# Patient Record
(demographics unavailable — no encounter records)

---

## 2018-03-20 NOTE — HP
DATE OF ADMISSION:  03/20/2018

 

HISTORY OF PRESENT ILLNESS:  The patient is a 79-year-old white female living at [facility] secondary to Parkinson's disease, schizophrenia, diabetes type 2 and hypertension.  She was transfe
rred to the emergency room today, because of a several day history of increasing edema, hypertension,
 and subsequent weakness secondary to leg pain and exertional dyspnea.  She has had some anxiety sinc
e the death of her roommate at the nursing [place] and has been unable to give significant history.  She
 denies any chest pain or palpitations.  She denies any noncompliance to her diet or medications.  
e has two-pillow orthopnea.  She has had some edema.

 

PAST MEDICAL HISTORY:  Remarkable for hypertension, hyperlipidemia, Parkinson's disease, recurrent ve
rtigo, mild dementia, schizophrenia, atrial fibrillation, gastroesophageal reflux.

 

PAST SURGICAL HISTORY:  Positive for bilateral hip replacement, cholecystectomy.

 

SOCIAL HISTORY:  She is a nonsmoker, nondrinker, living in a nursing home.

 

ALLERGIES:  No known allergies.

 

MEDICATIONS:  On admission included Januvia 100 mg daily, aspirin 81 daily, Fosamax 70 weekly, mecliz
ine 25 three times daily, metoprolol 25 twice daily, Bentyl 20 mg 3 times daily, Amaryl 4 mg daily, m
elatonin 10 mg nightly, oxybutynin 5 mg daily, Abilify 30 mg daily, Amantadine 100 mg twice daily, lo
razepam 0.5 mg twice daily, Zoloft 100 mg twice daily, buspirone 10 mg twice daily.

 

REVIEW OF SYSTEMS:

HEENT:  She denies any headache.  She does have recurrent dizziness, fairly stable on the meclizine 2
5 three times daily.  She has no change in her vision or hearing, hoarseness or dysphagia.

PULMONARY:  She has had some mild cough, but no sputum production.  Has noticed increased orthopnea.

CARDIOVASCULAR:  She denies chest pain, palpitations.  Does have two-pillow orthopnea.  Does has had 
some transient edema.

GASTROINTESTINAL:  She denies nausea, vomiting, diarrhea, constipation, abdominal pain.

GENITOURINARY:  Denies dysuria, hematuria, nocturia.

MUSCULOSKELETAL:  Denies dyspnea or swelling in joints or extremities.

NEUROLOGIC:  Denies localized numbness, weakness in arms or extremities.

 

PHYSICAL EXAMINATION:

GENERAL:  Patient is an elderly white female, in no acute distress.  This time with some mild dyspnea
 on exertion.  She is oriented x3 and a fair historian.

VITAL SIGNS:  Blood pressure of 188/86, temperature 97.9, pulse 72, respirations 21, O2 sats 93%.

HEENT:  Pupils are equal, round, and react to light and accommodation.  Sclerae are anicteric, Conjun
ctivae pale.  Oral mucous membranes well hydrated.

NECK:  Supple, no nodes or masses.  JVPs are not elevated.  Carotids 2+ and equal without bruits.

LUNGS:  Show decreased breath sounds in the bases, no wheezes, few rales above the bases.

CARDIAC:  Displays irregularly irregular rhythm.  No gallops or murmurs.  PMI in the fifth intercosta
l space, 1 cm left midclavicular line.

ABDOMEN:  Soft, nontender with no masses or organomegaly.

SKIN/EXTREMITIES:  Display trace edema, no clubbing, cyanosis.

NEUROLOGICAL:  Intact.

 

LABORATORY DATA AND X-RAY FINDINGS:  Chest x-ray shows cardiomegaly with bilateral effusions, pulmona
ry edema.  White count 4200, hematocrit 35, hemoglobin 11.  Troponin 0.33, MB 0.7.  BNP 1030.  Sodium
 138, potassium 3.9, chloride 102, bicarbonate 26, BUN 16, creatinine 0.85, glucose 86.  Accu-Cheks 1
.  Liver function studies normal.  Urinalysis does show 11-20 white cells, large leukocytes, 4+
 bacteria.

 

ASSESSMENT AND PLAN:  Uncontrolled hypertension with subsequent decompensation congestive heart failu
re, pulmonary edema, which responded to IV Lasix in the emergency room, but is still some mild respir
atory distress, still hypertensive.  She has been restarted back on her previous medications of metop
rolol 25 twice daily.  This will be increased to 50 twice daily and she will have echocardiogram done
 to evaluate for possible systolic and/or diastolic heart failure.  She will also be continued on fur
osemide 40 mg IV push tonight and in the a.m. We will have repeat basic metabolic profile, BNP in the
 a.m. as well as repeat troponin, although most likely does not show any evidence of acute ischemic e
vent on the EKG, which only shows atrial fibrillation with mild T-wave inversions, nonspecific ST abn
ormalities.  We will continue on the rate control with metoprolol.  We will also start on anticoagula
tion here in the hospital on Apixaban 2.5 mg twice daily.  She will also be started on Accu-Cheks and
 mild sliding scale and restarted back on her glimepiride 8 mg daily, and alogliptin 25 mg daily.  Fi
kyra, she will be continued on her antipsychotic medications of Abilify 5 mg daily, Buspirone 10 mg 
twice daily and her anti-Parkinson's medicines of Amantadine 100 twice daily and sertraline 100 twice
 daily.  She is not to be resuscitated at her request, but will be treated aggressively otherwise, ex
cept for a DNR status.

## 2018-03-20 NOTE — RAD
PORTABLE FRONTAL CHEST RADIOGRAPH

3/20/18

 

COMPARISON:  

2/9/16

 

HISTORY: 

Short of breath/dyspnea.

 

FINDINGS:  

The cardiac silhouette is prominent. There is pulmonary vascular congestion. There is new increased d
ensity in the right lung base suggesting right pleural fluid and nonspecific right pulmonary parenchy
mal opacity.

 

IMPRESSION:  

Pulmonary vascular congestion and prominence of the cardiac silhouette. Increased pleural and parench
ymal opacity in the right lung base suggests pulmonary edema. Right basilar infectious pneumonitis or
 aspiration cannot be excluded. Followup to resolution advised following treatment. 

 

POS: [facility]

## 2018-03-21 NOTE — PRG
DATE OF SERVICE:  03/21/2018

 

SUBJECTIVE:  The patient feels much better.  Decreased cough and dyspnea.  Slept well through the [facility]
ht.  No headaches, dizziness or chest pain and stable 1-2 pillow orthopnea.

 

OBJECTIVE:  

VITAL SIGNS:  Blood pressure down to 150/69, O2 sats 92% on room air, respirations 20, pulse 73, temp
erature was up to 100.1 last night.  

 

LABORATORY:  Laboratory this morning shows a white count 3900, hematocrit 37, hemoglobin 12, sodium 1
37, potassium down 3.5, chloride 98, bicarbonate 27, BUN 13, creatinine 0.87, glucose 107.  BNP is do
wn to 797 from 1030.  Troponin level is still pending.  

 

Intake and output shows 520 in, 3725 out.  

 

Chest x-ray is pending this morning.  

 

LUNGS:  Lungs show decreased breath sounds in the bases, but no rales or rhonchi.  

CARDIAC:  Cardiac examination shows irregular irregular rhythm.  No gallops or murmurs.  SKIN AND EXT
REMITIES:  Skin and extremities show no edema.

 

ASSESSMENT:

1.  Atrial fibrillation with rate control and anticoagulation.

2.  Uncontrolled hypertension with probable diastolic heart failure with possible acute systolic deco
mpensation, awaiting results of echocardiogram, but responding well to IV furosemide with significant
 diuresis and improvement in BNP and physical exam.  We will continue IV furosemide today and check a
 BMP in the a.m. and possibly change to oral furosemide.

3.  Uncontrolled hypertension, now controlled on metoprolol.

4.  Bipolar disorder and schizophrenia, controlled on Abilify.

5.  Diabetes type 2, stable.

6.  Parkinson's disease, stable.

7.  Urinary tract infection with culture pending, on Cipro 250 twice daily.

 

PLAN:

1.  Continue Lasix 40 mg IV twice daily today and then switch to oral tomorrow.

2.  Check echocardiogram.

3.  Repeat basic metabolic profile in the a.m. as well as BNP.

4.  Check troponin today.

5.  Start PT, OT.

6.  Start on potassium 20 mEq daily orally.  

7.  Continue psych medications.

8.  Continue Accu-Cheks and mild sliding scale and continue on home medications of glimepiride.

9.  Follow up with Dr. Yu tomorrow.

## 2018-03-21 NOTE — RAD
ONE VIEW CHEST:

 

HISTORY:

Congestive heart failure.

 

COMPARISON:

03/20/2018

 

FINDINGS:

Limited evaluation due to technique.  Heart is enlarged.  Pulmonary vessels are slightly prominent.  
There are bibasilar pleural and parenchymal changes.  Stable opacification of the right lung base.  N
o pneumothorax or osseous abnormalities.

 

IMPRESSION:

1.  Cardiomegaly.

 

2.  Pulmonary vascular congestion.

 

3.  Bibasilar pleural and parenchymal changes.

 

4.  Congestive heart failure.  Continued surveillance.

 

POS: [facility]

## 2018-03-22 NOTE — PRG
DATE OF SERVICE:  03/22/2018

 

SUBJECTIVE:  Ms. Álvarez is doing well.  Denies any complaints.  She apparently ambulated with therapy
.  She states her leg swelling has pretty much resolved.  She denies any chest pain or shortness of b
reath.  I advised her that I am going to switch her to oral antibiotics and oral Lasix.  Remove her F
oley catheter and hopefully get her discharged tomorrow.

 

OBJECTIVE:

VITAL SIGNS:  She is afebrile, heart rate 67, respirations 20, oxygen saturation 93% on room air, blo
od pressure 133/68.

CARDIOVASCULAR SYSTEM:  S1, S2 plus.

RESPIRATORY SYSTEM:  Normal vesicular breath sounds.

ABDOMEN:  Soft, nontender, bowel sounds heard in all quadrants.

EXTREMITIES:  Without cyanosis or clubbing.  Trace edema.  Weight has gone down from 182-178 pounds.

 

IMAGING DATA:  Echocardiogram shows ejection fraction of 50%-55% with mild left ventricular hypertrop
hy, mild to moderate tricuspid regurgitation, mild pulmonic regurgitation, and mild mitral regurgitat
ion.  She was in atrial fibrillation throughout the study.

 

IMPRESSION:

1.  Escherichia coli urinary tract infection.

2.  Likely diastolic congestive heart failure.

3.  Atrial fibrillation.

4.  Gastroesophageal reflux disease.

5.  Mild dementia.

6.  Dyslipidemia.

7.  Hypertension.

 

PLAN:

1.  Switch to p.o. Lasix.

2.  Decrease potassium to 10 mg daily.

3.  Discontinue Cipro and start her on Macrobid.

4.  Continue therapy.

5.  Discontinue Larios catheter.

6.  Recheck laboratory values in the morning and anticipate discharging her back to the nursing home 
tomorrow.

## 2018-03-23 NOTE — DIS
DATE OF ADMISSION:  03/20/2018

 

DATE OF DISCHARGE:  03/23/2018

 

PRINCIPAL DIAGNOSES:

1.  Acute congestive heart failure, likely diastolic, resolved.

2.  Urinary tract infection with Escherichia coli on Macrobid.

3.  Mild Alzheimer's type dementia.

4.  Atrial fibrillation.

5.  Gastroesophageal reflux disease.

6.  Dyslipidemia.

7.  Hypertension.

 

COMPLICATIONS:  None.

 

ADVERSE REACTIONS:  None.

 

PROCEDURES:  None.

 

CONSULTATIONS:  None.

 

HOSPITAL COURSE:  The patient was admitted by Dr. Faheem Montaño on 03/20/2018 with increasing edema, 
shortness of breath, and weakness.  Evaluation showed elevated BNP and possible UTI.  She was started
 on IV Cipro and IV Lasix.  She responded very well.  She has been switched to oral Lasix and the uri
ne cultures grew E. coli which is resistant to Cipro.  She has been switched to Macrobid.  She has be
en ambulating with assistant and she is off her oxygen.  Her leg swelling has resolved.  She has lost
 about 4 pounds and she was deemed stable for discharge back to the nursing facility.

 

DISCHARGE MEDICATIONS:

1.  Januvia 100 mg daily.

2.  Aspirin 81 mg daily.

3.  Fosamax 70 mg every weekly.

4.  Meclizine 25 mg t.i.d. p.r.n.

5.  Metoprolol 25 mg b.i.d.

6.  Bentyl 20 mg t.i.d.

7.  Amaryl 4 mg daily.

8.  Melatonin 10 mg at night.

9.  Oxybutynin 5 mg daily.

10.  Abilify 30 mg daily.

11.  Amantadine 100 mg b.i.d. that is for Parkinson's.

12.  Zoloft 100 mg a day.

13.  BuSpar 10 mg b.i.d.

14.  Lasix 20 mg daily.

15.  Potassium 10 mEq daily.

 

She is to be on an 1800 calorie Heart healthy ADA diet.  She is to get BMP rechecked on 03/28/2018.  
PT/OT to evaluate and treat her in the nursing facility.  She will be followed by Dr. Faheem ramirez the nursing facility.  No prescriptions needed.

 

PHYSICAL EXAMINATION:

VITAL SIGNS:  On the day of discharge, she is afebrile, heart rate 64, respirations 20, oxygen satura
tion 94% on room air, blood pressure 126/74.

CARDIOVASCULAR SYSTEM:  S1 and S2 plus.  Rate and rhythm regular.

RESPIRATORY SYSTEM:  Normal vesicular breath sounds.

ABDOMEN:  Soft, nontender, bowel sounds heard in all quadrants.

EXTREMITIES:  Without cyanosis or clubbing.  Peripheral pulses are palpable.

CENTRAL NERVOUS SYSTEM:  Grossly nonfocal.

 

For full details, please see chart.

## 2018-10-01 NOTE — PDOC.FPRHP
- History of Present Illness


Chief Complaint: Speech changes


History of Present Illness: 


Ms Álvarez is a 78 yo nursing home resident with pmh of Afib, DMII, HTN, HLD 

presenting with left sided weakness and speech changes that started this 

morning. She ambulates with a walker. This AM she slid from her bed onto her 

left hip. She was taken for Xray of her L hip and shortly after developed 

speech changes and left sided weakness. Pt is unaware of any changes and states 

she feels fine. Denies hx of CVA. Denies HA, weakness. 


Per [facility] nurse who cares for her daily she does speak 

with a soft spoken voice, uses a walker to ambulate but no unilateral weakness. 

Family comes to visit, they live far away but call frequently. Pulse usually 

runs in the 60's but has never seen it in the 40's





ED Course: 


ASA








- Allergies/Adverse Reactions


 Allergies











Allergy/AdvReac Type Severity Reaction Status Date / Time


 


No Known Allergies Allergy   Verified 10/01/18 19:00














- Home Medications


 











 Medication  Instructions  Recorded  Confirmed  Type


 


Alendronate Sodium [Fosamax] 70 mg PO Q7D 06/28/13 10/01/18 History


 


Amantadine HCl [Amantadine] 100 mg PO BID 06/28/13 10/01/18 History


 


Aripiprazole [Abilify] 40 mg PO HS 06/28/13 10/01/18 History


 


Aspirin [Aspir 81] 81 mg PO DAILY 06/28/13 10/01/18 History


 


Calcium 600 + Vit D Tablet 1 tab PO DAILY 06/28/13 10/01/18 History


 


Glimepiride [Amaryl] 4 mg PO QAM-WM 06/28/13 10/01/18 History


 


Januvia 1 tab PO DAILY 06/28/13 10/01/18 History


 


Meclizine HCl [Antivert] 25 mg PO TID 06/28/13 10/01/18 History


 


Metoprolol Tartrate [Lopressor] 25 mg PO BID 06/28/13 10/01/18 History


 


Aluminum & Magnesium Hydroxide 30 ml PO Q4HR PRN 03/20/18 10/01/18 History





[Maalox]    


 


Dicyclomine [Bentyl] 20 mg PO TID 03/20/18 10/01/18 History


 


Melatonin 10 mg PO HS 03/20/18 10/01/18 History


 


Oxybutynin Chloride 5 mg PO HS 03/20/18 10/01/18 History


 


Sertraline HCl [Zoloft] 200 mg PO DAILY 03/20/18 10/01/18 History


 


busPIRone HCl [Buspirone HCl] 10 mg PO BID 03/20/18 10/01/18 History


 


Acetaminophen [Tylenol Regular 650 mg PO Q6H PRN  tab 03/23/18 10/01/18 Rx





Strength]    


 


Furosemide [Lasix] 20 mg PO DAILY  tab 03/23/18 10/01/18 Rx


 


Magnesium Hydroxide [Milk of 400 mg PO BID PRN #0 03/23/18 10/01/18 Rx





Magnesia]    


 


Potassium Chloride [K-Dur] 10 meq PO QAM-WM  tab 03/23/18 10/01/18 Rx


 


Ipratropium-Albuterol [Combivent] 2 puff INH BID PRN 10/01/18 10/01/18 History


 


Loperamide HCl [Loperamide] 2 mg PO ASDIR PRN 10/01/18 10/01/18 History


 


guaiFENesin/Dextromethorphan 5 ml PO Q6H PRN 10/01/18 10/01/18 History





[Guaifenesin Dm Syrup]    














- History


PMHx: Parkinson's, Dementia, bipolar?, A-fib, DMII, GERD, HLD, HTN, osteoporosis


 


PSHx: b/l hip replacement





FHx: Mother- MI


 


Social: Denies tobacco, alcohol or drug use. Lives in a nursing facility


 








- Review of Systems


General: denies: fever/chills, weight/appetite/sleep changes, night sweats


Eyes: denies: eye pain, vision changes


ENT: denies: nasal congestion, rhinorrhea


Respiratory: denies: cough, congestion, shortness of breath


Cardiovascular: denies: chest pain, palpitation, edema


Gastrointestinal: denies: nausea, vomiting, diarrhea, constipation, abdominal 

pain


Genitourinary: denies: dysuria, polyuria


Skin: denies: rashes, lesions


Musculoskeletal: denies: pain, tenderness, swelling


Neurological: denies: numbness, syncope, weakness


Psychological: denies: anxiety, depression





- Vital signs


BP: 148  HR: 57 RR: 49 Tmax: 15 Pox: 95% on RA  Wt: 68   








- Physical Exam


Constitutional: NAD, awake, alert and oriented, well developed


HEENT: normocephalic and atraumatic, TM's clear and intact (right ear impacted)

, normal nasal mucosa, MMM, oropharynx clear, other (upper dentures)


Neck: supple, trachea midline


Heart: RRR, pulses present, no edema


Lungs: CTAB, no wheezing


Abdomen: soft, non-tender, bowel sounds present, no masses/distention


Neurological: CN II-XII intact, DTRs 2+, other (5/5 strenght LE & UE)


Skin: capillary refill <2 seconds


Psychiatric: normal mood and affect, good judgment and insight, intact recent 

and remote memory





FMR H&P: Results





- EKG Interpretation


EKG: 


Sinus sandie, PACs, 1st degree AV block





- Radiology Interpretation


  ** CT scan - head


Status: report reviewed by me


Additional comment: 


No acute abnormalities





FMR H&P: A/P





- Problem List


(1) TIA (transient ischemic attack)


Current Visit: Yes   Status: Acute   Code(s): G45.9 - TRANSIENT CEREBRAL 

ISCHEMIC ATTACK, UNSPECIFIED   





(2) Atrial fibrillation


Current Visit: No   Status: Chronic   Code(s): I48.91 - UNSPECIFIED ATRIAL 

FIBRILLATION   


Qualifiers: 


   Atrial fibrillation type: chronic   Qualified Code(s): I48.2 - Chronic 

atrial fibrillation   





(3) Diabetes type 2, controlled


Current Visit: No   Status: Chronic   Code(s): E11.9 - TYPE 2 DIABETES MELLITUS 

WITHOUT COMPLICATIONS   





(4) Dyslipidemia


Current Visit: No   Status: Chronic   Code(s): E78.5 - HYPERLIPIDEMIA, 

UNSPECIFIED   





(5) Osteoporosis


Current Visit: No   Status: Chronic   Code(s): M81.0 - AGE-RELATED OSTEOPOROSIS 

W/O CURRENT PATHOLOGICAL FRACTURE   





(6) Parkinsonian features


Current Visit: No   Status: Chronic   Code(s): R25.9 - UNSPECIFIED ABNORMAL 

INVOLUNTARY MOVEMENTS   





(7) HTN (hypertension)


Current Visit: Yes   Status: Acute   Code(s): I10 - ESSENTIAL (PRIMARY) 

HYPERTENSION   





- Plan





Left sided weakness/Speech changes


- TIA vs CVA


- No focal deficits on exam, pt appears to be at baseline speech. 


- CT: no acute changes


- PT/OT eval


- Dysphagia screen


- Admit to Stroke


- MRI


- No CTA of neck, pt not candidate for endarterectomy


- Continue to monitor overnight


- Continue ASA





Fall


- Slipped out of bed this AM onto left hip


- Uses walker to ambulate


- PT/OT eval





Paroxysmal Afib


- Hold metoprolol for bradycardia





DMII


- Continue home Januvia, glimepiride


- Mild SSI


- ACHS accuchecks


- hypoglycemic protocol





Incontience


- Oxybutynin





Bipolar Disorder


- Zoloft, abilify, Buspirone





HTN


- Holding metoprolol for bradycardia





IBS


- Continue home Bentyl





Osteoporosis


- Pt on Ca/Vit D3, and fosamax





Insomnia


- Continue home melatonin





Vertigo


- Continue home meclizine





Code Status: FULL


DVT ppx: Lovenox














FMR H&P: Upper Level





- Pertinent history





Ms. Álvarez is a 78 yo F with a PMHx of a fib, T2DM, HTN, HLD, Parkinson's and 

Bipolar D/O who presents as a [facility] from [place] for AMS. She seemed altered to NH 

staff and was noted to have slurred speech and L sided weakness in arm and leg 

1 hour prior to presentation at [place] ED. She typically ambulates with a 

walker and slid from her bed this morning onto her L hip which was xray-ed at 

OSH. Shortly after that she was noted to have changes above. She does not 

notice any weakness or changes in her speech and per NH staff she speaks softly 

and is at times difficult to understand. 








- Pertinent findings





Exam: VSS apart from bradycardia and mild HTN


Gen: awake, alert, oriented x3


HEENT: PERRL, EOMI, sclera anicteric, no carotid bruits


CV: RRR, no murmur noted


RESP: CTAB


ABD: soft, nontender to palpation


Ext: No peripheral edema, pulses 1+ throughout


Neuro: CN II-XII intact, strength 4/5 in LUE () and LLE on extension, 5/5 

in RUE and RLE





- Plan


Date/Time: 10/01/18 1503





78 yo F presents as [facility]CO[facility] for concern for stroke





1. LUE and LLE weakness: TIA vs CVA. Patient appears to be at baseline for 

speech - no slurring noted but difficult to understand. CT brain negative. Will 

admit to stroke, dysphagia screen and neurochecks q4 hours. Will plan for MRI 

in the morning. Continue ASA. Will discuss CTA. 


2. Fall: Hip xray negative. Slipped out of bed this AM onto left hip. PT/OT 

eval.


3. Paroxysmal a fib: Will hold metoprolol at this time for bradycardia in the 

40s.


4. T2DM: Continue home meds, mild SSI with accuchecks


5. Incontinence: Oxybutynin


6. Bipolar Disorder: Zoloft, abilify, Buspirone


7. HTN: Slightly elevated. Holding metoprolol. Goal 150/90. Consider different 

agent. Permissive HTN.


8. IBS: Home bentyl


9. Osteoporosis: Home Ca/Vit D3, and fosamax


10. Insomnia: Melatonin


11. Vertigo: Meclizine. Asymptomatic at this time. Increases fall risk.





Code Status: DNR





DVT ppx: SCDs





I, Layla López MD, PGY-3, have evaluated this patient and agree with findings/

plan as outlined by intern resident. Pertinent changes/additions are listed 

here.








Attending Addendum





- Attending Addendum


Date/Time: 10/01/18 4007





I personally evaluated the patient and discussed the management with Dr. Tai and Dr. López


I agree with the History, Examination, Assessment and Plan documented above 

with any addition or exceptions noted below.





78 yo female with multiple medical problems presents to ER for evaluation of 

left sided facial drop, fall, left sided weakness, and change in speech. 


Past history of TIA vs CVA per patient. Completely oriented at this time. No 

facial droop. Left sided weakness at baseline. Difficulty with speech but 

unsure what exactly her baseline is. Will discuss with nursing staff at SNF. 


VS reviewed. 


Labs reviewed. 


Imaging reviewed. 





Admit for CVA rule out. No hemorrhage on CT scan. Will order MRI in AM. 

Continue to monitor closely. Symptoms appear resolved. Will follow up with 

nursing staff at SNF to understand patient's baseline speech and left sided 

weakness. Give full dose ASA. Monitor BP. Place on tele. Order ECHO. Start 

statin. Consult stroke team. Consider carotid imaging. No bruits on exam. Will 

discuss if patient interested in surgical option if needed prior to imaging. 


Adjust all other home meds as needed. 





Latoya

## 2018-10-01 NOTE — CT
CT BRAIN WITHOUT CONTRAST:

 

Date:  10/01/18 

 

HISTORY:  

TIA. 

 

FINDINGS:

 

Comparison made with exam of 02/09/16. 

 

No evidence of infarct, hemorrhage, midline shift, or abnormal extra-axial fluid collections are seen
. The ventricular size is appropriate and the basilar cisterns are patent. The bony calvarium is inta
ct. The visualized paranasal sinuses and mastoid air cells are well aerated. 

 

IMPRESSION: 

No CT evidence of acute intracranial process.  

 

POS: [facility]

## 2018-10-02 NOTE — CON
DATE OF CONSULTATION:  10/02/2018

 

CONSULTING PHYSICIAN:  [facility].

 

IMPRESSION:

1.  Probable cardioembolic stroke secondary to intermittent atrial fibrillation.

2.  Bradycardia secondary to beta blockers.

3.  Diabetes.

4.  Tremor.

5.  Mild dementia.

 

PLAN:

1.  Continue aspirin.

2.  Add Plavix.

3.  Carotid ultrasound.

 

HISTORY OF PRESENT ILLNESS:  Ms. Álvarez is a 79-year-old woman who came in from the [facility].  She apparently slipped from her bed and fell to the floor.  She was brought in for evaluation.
  She was subsequently noted to have a bit of weakness on the left side.  Her speech was a bit slurre
d, which lasted for a short while.  She was noted to be in intermittent atrial fibrillation.  She has
 had a history of falls in the past, breaking both hips.  Dr. Robison evaluated her for the atrial f
ibrillation and did not suggest anticoagulants due to her risk of falling.  She was quite bradycardic
 and has discontinued her beta blocker.  She is without any particular focal neurologic complaints at
 this point.

 

PAST MEDICAL HISTORY:  As listed above.

 

ALLERGIES:  None reported.

 

SOCIAL HISTORY:  No tobacco use.

 

FAMILY HISTORY:  Noncontributory.

 

REVIEW OF SYSTEMS:  No complaint of headache, nausea, dizziness, chest pain, shortness of breath.

 

PHYSICAL EXAMINATION:

VITAL SIGNS:  Blood pressure 141/150, pulse 52, respirations 15, temperature 96.

HEENT:  Pupils are equal and reactive.  Conjunctivae clear.  Oropharynx is clear.

NECK:  No lymphadenopathy noted.

EXTREMITIES:  No cyanosis present.

NEUROLOGIC:  She was alert and oriented to person and place.  Her speech was fluent and clear.  She f
ollowed commands appropriately.  She seemed quite appropriate in her demeanor as well.  Cranial nerve
s were intact.  Motor exam showed a fix with arm roll testing, but otherwise, her strength seemed umair
te symmetric.  Sensation was equal to light touch.  Gait was not tested.  Plantar response was downgo
ing on the right and upgoing on the left.  No abnormal movements were seen.

 

MRI of the brain showed a patchy area in the right watershed region showing acute stroke.

 

SUMMARY:  This is an elderly lady who had a minor neurologic deficit as a result of a probable cardio
embolic event, suspect that either that or she had a drop in pressure that resulted in watershed isch
emia.  Ruling out a high-grade stenosis in the right carotid would be appropriate, otherwise adding P
lavix would seem to be a reasonable option short of going on to anticoagulants.

## 2018-10-02 NOTE — PRG
DATE OF SERVICE:  10/02/2018

 

Ms. Álvarez is a 79-year-old white female who was admitted with TIA type symptoms.  We have still pend
ing an MRI of the brain.  She was also noted during sleep to drop her heart rate down into the 30s an
d while awake into the 40s all of which have been asymptomatic.  However, one of her EKGs does show a
 junctional rhythm with a slow rate.  I am concerned about the possibility we are seeing an impending
 development of sick sinus syndrome.  In any event, we will wait and consult Cardiology for their inp
ut given these episodes of slurred speech.  We will continue with our TIA workup and treatment.

## 2018-10-02 NOTE — PDOC.FM
- Subjective


Subjective: 


No overnight events. Denies weakness, SOB, CP, palpitations, dizziness, 

lightheadedness. No questions or concerns. 








- Objective


MAR Reviewed: Yes


Vital Signs & Weight: 


 Vital Signs (12 hours)











  Temp Pulse Resp BP Pulse Ox


 


 10/02/18 04:07  98 F  46 L  16  132/77  92 L


 


 10/01/18 22:55  98.8 F  45 L  12  137/62  92 L


 


 10/01/18 20:24   48 L   132/60 


 


 10/01/18 19:06  99.0 F  48 L  19  115/74  93 L








 Weight











Weight                         76.793 kg














I&O: 


 











 09/30/18 10/01/18 10/02/18





 06:59 06:59 06:59


 


Intake Total   340


 


Output Total   400


 


Balance   -60














Phys Exam





- Physical Examination


Constitutional: NAD


Respiratory: no wheezing, clear to auscultation bilateral


Cardiovascular: RRR, no significant murmur


Gastrointestinal: soft, non-tender, positive bowel sounds


Musculoskeletal: no edema, pulses present


Neurological: non-focal, moves all 4 limbs


5/5 UE & LE strength


Psychiatric: normal affect, A&O x 3


Skin: normal turgor, cap refill <2 seconds





Dx/Plan


(1) TIA (transient ischemic attack)


Code(s): G45.9 - TRANSIENT CEREBRAL ISCHEMIC ATTACK, UNSPECIFIED   Status: 

Acute   





(2) Atrial fibrillation


Code(s): I48.91 - UNSPECIFIED ATRIAL FIBRILLATION   Status: Chronic   


Qualifiers: 


   Atrial fibrillation type: chronic   Qualified Code(s): I48.2 - Chronic 

atrial fibrillation   





(3) Diabetes type 2, controlled


Code(s): E11.9 - TYPE 2 DIABETES MELLITUS WITHOUT COMPLICATIONS   Status: 

Chronic   





(4) Dyslipidemia


Code(s): E78.5 - HYPERLIPIDEMIA, UNSPECIFIED   Status: Chronic   





(5) Osteoporosis


Code(s): M81.0 - AGE-RELATED OSTEOPOROSIS W/O CURRENT PATHOLOGICAL FRACTURE   

Status: Chronic   





(6) Parkinsonian features


Code(s): R25.9 - UNSPECIFIED ABNORMAL INVOLUNTARY MOVEMENTS   Status: Chronic   





(7) HTN (hypertension)


Code(s): I10 - ESSENTIAL (PRIMARY) HYPERTENSION   Status: Acute   





- Plan


Plan: 


Left sided weakness/Speech changes


- TIA vs CVA


- No focal deficits on exam, pt appears to be at baseline speech. 


- CT: no acute changes


- PT/OT eval


- MRI today


- No CTA of neck, pt not candidate for endarterectomy


- Continue ASA





Asymptomatic Bradycardia


- As low as 36 overnight


- EKG at outside facility NSR with PACs


- EKG repeated here 10/1 with junctional rhythm


- Consult Cardiology





Fall


- Slipped out of bed 10/1 onto left hip


- Xrays preformed prior to weakness/speech changes


- Uses walker to ambulate


- PT/OT eval





Paroxysmal Afib


- Continue to monitor on tele





DMII


- Continue home Januvia, glimepiride


- Mild SSI


- ACHS accuchecks


- hypoglycemic protocol





Incontinence


- Oxybutynin





Bipolar Disorder


- Zoloft, abilify, Buspirone





HTN


- Holding metoprolol for bradycardia





IBS


- Continue home Bentyl





Osteoporosis


- Pt on Ca/Vit D3, and fosamax





Insomnia


- Continue home melatonin





Vertigo


- Continue home meclizine





Code Status: FULL


DVT ppx: Lovenox

## 2018-10-02 NOTE — CON
DATE OF CONSULTATION:  10/02/2018

 

HISTORY OF PRESENT ILLNESS:  The patient is a 79-year-old woman who presented with difficulty speakin
g and weakness.  The patient has a history of paroxysmal atrial fibrillation.  She lives in the [facility].  Apparently she had a fall and had difficulty speaking.  The patient denies having 
any chest discomfort or palpitations.

 

PAST MEDICAL HISTORY:  

1.  Hypertension.

2.  Atrial fibrillation.

3.  Parkinson's disease.

4.  History of dementia.

 

PAST SURGICAL HISTORY:  She has had hip surgery and cholecystectomy.

 

MEDICATIONS:  Include glimepiride 8 mg daily, aspirin 81, Januvia 100 daily, lorazepam 0.5 twice a da
y, Aricept 10 daily, amantadine 100 b.i.d., meclizine 25 daily, metoprolol 25 b.i.d., Zoloft 100 memo
y, Bentyl 20 mg 3 times a day, oxybutynin 5 mg t.i.d., trazodone 50 daily, melatonin 3 mg daily, and 
Abilify 30 daily.

 

SOCIAL HISTORY:  She lives in the [facility].  Nonsmoker.

 

FAMILY HISTORY:  No strong family history of coronary artery disease.

 

REVIEW OF SYSTEMS:  Ten-point system otherwise unremarkable.

 

PHYSICAL EXAMINATION:

GENERAL:  This is an elderly woman in no acute distress.

VITAL SIGNS:  Blood pressure was 104/64.

NECK:  Showed no jugular vein distention.

LUNGS:  Clear to auscultation.

HEART:  Regular rate and rhythm, normal S1, S2 with a 3/6 systolic murmur.

ABDOMEN:  Nondistended.

EXTREMITIES:  No edema.

VASCULAR:  Radial pulses are 2+.

 

LABORATORY RESULTS:  Her white blood count 5.5, hemoglobin 11.1, hematocrit 36.1, platelets 180.  Sod
ium was 139, potassium 3.8, chloride 105, bicarbonate 25.  Her BUN was 18, creatinine was 0.78.  Trop
onin was 0.018.  

 

Her EKG revealed her to have an ectopic atrial rhythm with a heart rate of 48.

 

IMPRESSION:

1.  Ectopic atrial rhythm.

2.  Cerebrovascular accident.

3.  Hypertension.

4.  History of atrial fibrillation.

5.  Dementia.

6.  Diabetes mellitus.

 

This patient presents with a cerebrovascular accident.  The patient has marked bradycardia.  She is o
n metoprolol.  The patient was on a beta-blocker, this medication has been discontinued.  We will mon
itor the patient with you through her hospitalization.  She has a history of atrial fibrillation and 
has suffered a cerebrovascular accident.  The patient would be a poor candidate for anticoagulation t
herapy with a history of multiple falls and hip fracture.  We will follow this patient with you throu
gh her hospitalization.

## 2018-10-02 NOTE — MRI
MRI BRITTA WITH AND WITHOUT IV CONTRSAT:

 

HISTORY: 

TIA, left-sided weakness.

 

FINDINGS: 

Correlation is made with the CT scan of the previous day.

 

There are multiple foci of restricted diffusion in the watershed zone of the right cerebral hemispher
e.  This is consistent with a right watershed infarct.  Additionally, there is a small focal area of 
restricted diffusion in the right posterior frontal cortex consistent with acute infarction.  No hemo
rrhage is seen.  No midline shift or abnormal extraaxial fluid collections are identified.  No eviden
ce of mass or abnormal postcontrast enhancement is identified.  The ventricular size is appropriate a
nd the basilar cisterns are patent.  There are changes of chronic small-vessel ischemic disease in th
e periventricular white matter. 

 

IMPRESSION: 

1.  Acute multiple tiny lacunar infarctions in the watershed zone of the right cerebral hemisphere.

 

2.  Small acute right posterior frontal lobe cortical infarction.

 

POS: ELA

## 2018-10-03 NOTE — PRG
DATE OF SERVICE:  10/03/2018

 

This is an addendum to the note of Dr. Michela Tai.  

 

Ms. Álvarez it continues to feel well.  She has been seen by Cardiology and Neurology and we appreciat
e their recommendations.  Neurology has suggested that we look at her carotid arteries with ultrasoun
d and also added Plavix.  We will discuss further with Cardiology any need for further workup for the
 patient's bradyarrhythmias.  Her beta blockers have been stopped, but she had not been on them excep
t for 1 tablet prior to admission to the hospital.

## 2018-10-03 NOTE — PDOC.FM
- Subjective


Subjective: 


No overnight events. Feeling well and strength is back. She does recognize when 

her blood sugar is low as she feels fatigued and sweaty. No questions or 

concerns.





- Objective


MAR Reviewed: Yes


Vital Signs & Weight: 


 Vital Signs (12 hours)











  Temp Pulse Resp BP Pulse Ox


 


 10/03/18 04:14  97.9 F  39 L  20  125/56 L  95


 


 10/03/18 00:00  98.7 F  46 L  18  127/60  92 L


 


 10/02/18 19:25  98.6 F  48 L  12  149/65 H  97








 Weight











Weight                         76.793 kg














I&O: 


 











 10/01/18 10/02/18 10/03/18





 06:59 06:59 06:59


 


Intake Total  340 720


 


Output Total  400 


 


Balance  -60 720














Phys Exam





- Physical Examination


Constitutional: NAD


Neck: supple


Respiratory: no wheezing, clear to auscultation bilateral


Cardiovascular: RRR, no significant murmur


Gastrointestinal: soft, non-tender, positive bowel sounds


Neurological: moves all 4 limbs


5/5  wrist 


Psychiatric: normal affect, A&O x 3





Dx/Plan


(1) TIA (transient ischemic attack)


Code(s): G45.9 - TRANSIENT CEREBRAL ISCHEMIC ATTACK, UNSPECIFIED   Status: 

Acute   





(2) Atrial fibrillation


Code(s): I48.91 - UNSPECIFIED ATRIAL FIBRILLATION   Status: Chronic   


Qualifiers: 


   Atrial fibrillation type: chronic   Qualified Code(s): I48.2 - Chronic 

atrial fibrillation   





(3) Diabetes type 2, controlled


Code(s): E11.9 - TYPE 2 DIABETES MELLITUS WITHOUT COMPLICATIONS   Status: 

Chronic   





(4) Dyslipidemia


Code(s): E78.5 - HYPERLIPIDEMIA, UNSPECIFIED   Status: Chronic   





(5) Osteoporosis


Code(s): M81.0 - AGE-RELATED OSTEOPOROSIS W/O CURRENT PATHOLOGICAL FRACTURE   

Status: Chronic   





(6) Parkinsonian features


Code(s): R25.9 - UNSPECIFIED ABNORMAL INVOLUNTARY MOVEMENTS   Status: Chronic   





(7) HTN (hypertension)


Code(s): I10 - ESSENTIAL (PRIMARY) HYPERTENSION   Status: Acute   





- Plan


Plan: 


Acute CVA


- Cardioembolic 2/2 paroxysmal afib vs watershed ischemia due to hypotension


- Pt appears to be back at baseline with no focal neurological deficits


- CT: no acute changes


- MRI: Acute multiple tiney lacunar infarts in watershed zone of right cerebral 

hemishphere. Small acute right posterior frontal lobe cortical infarct


- PT/OT 


- No CTA of neck, pt not candidate for endarterectomy


- Continue ASA


- Will start Plavix


- Not a candidate for anticoagulation due to recurrent falls. 





Asymptomatic Bradycardia


- Ectopic atrial rhythm


- As low as 39 overnight


- EKG at outside facility NSR with PACs


- EKG repeated here 10/1 with junctional rhythm


- Consulted Cardiology, apprec recs


- Pt had been on BB in past but after talking with PCP Dr Montaño she has not 

been taking it for some time now. Pulse was in the high 70's at admission. She 

did receive a dose the evening of 10/1/18 but this was discontinued due to her 

bradycardia overnight.





Fall


- Slipped out of bed 10/1 onto left hip


- Xrays preformed prior to weakness/speech changes


- Uses walker to ambulate


- PT/OT eval





Paroxysmal Afib


- Continue to monitor on tele


- Not a candidate for anticoagulation due to recurrent falls.





Hypoglycemia


- Pt is DMII, she has had recurrent episodes of symptomatic hypoglycemia as low 

as nurse reported 30's.


- Speaking with her yesterday she was very hot and appeared drowsy with blood 

glucose of 58. 


- As low as 68 overnight


- Will stop Glimepiride





DMII


- Continue home Januvia


- Stop glimepiride due to hypoglycemia


- Mild SSI


- ACHS accuchecks


- hypoglycemic protocol





Incontinence


- Oxybutynin





Bipolar Disorder


- Zoloft, abilify, Buspirone





HTN


- Holding metoprolol for bradycardia





IBS


- Continue home Bentyl





Osteoporosis


- Pt on Ca/Vit D3, and fosamax





Insomnia


- Continue home melatonin





Vertigo


- Continue home meclizine





Code Status: FULL


DVT ppx: Lovenox

## 2018-10-03 NOTE — ULT
CAROTID DOPPLER ULTRASOUND:

 

Date:  10/03/18 

 

HISTORY:  

CVA. 

 

COMPARISON:  

None. 

 

TECHNIQUE:  

Real-time Gray scale, color Doppler, and spectral analysis of the extracranial carotid and vertebral 
arteries was performed. 

 

FINDINGS: 

There is moderate atherosclerotic plaque of both carotid bulbs. Antegrade flow both vertebral arterie
s. 

 

No elevated peak systolic velocities within the internal carotid arteries. 

 

Right ICA/CCA ratio is 1.26. 

Left ICA?CCA ratio is 1.13. 

 

IMPRESSION: 

No hemodynamically significant stenosis. Moderate atherosclerotic plaque. 

 

 

POS: KYLE

## 2018-10-04 NOTE — PDOC.FM
- Subjective


Subjective: 


Feeling well this morning. Has been able to get up and walk to the restroom. 

Reports not having BM since admission but feels that she needs to have one 

currently. Reports 1 episode of symptomatic hypoglycemia overnight that 

resolved with orange juice. Patient is not interested in any invasive 

procedures. She said she needs to discuss things with her son if any 

intervention is recommended. No other questions or concerns. 








- Objective


MAR Reviewed: Yes


Vital Signs & Weight: 


 Vital Signs (12 hours)











  Temp Pulse Resp BP Pulse Ox


 


 10/04/18 04:00  97.7 F  45 L  16  151/69 H  95


 


 10/04/18 00:00  97.9 F  51 L  19  169/71 H  92 L


 


 10/03/18 20:00      94 L


 


 10/03/18 19:51  98.6 F  49 L  20  181/70 H  94 L








 Weight











Weight                         76.793 kg














I&O: 


 











 10/02/18 10/03/18 10/04/18





 06:59 06:59 06:59


 


Intake Total 


 


Output Total 400  


 


Balance -60 720 1450














Phys Exam





- Physical Examination


Constitutional: NAD


Neck: supple


Respiratory: no wheezing, clear to auscultation bilateral


Cardiovascular: RRR, no significant murmur


Gastrointestinal: soft, non-tender, positive bowel sounds


Musculoskeletal: no edema, pulses present


Psychiatric: normal affect, A&O x 3


Skin: cap refill <2 seconds





Dx/Plan


(1) TIA (transient ischemic attack)


Code(s): G45.9 - TRANSIENT CEREBRAL ISCHEMIC ATTACK, UNSPECIFIED   Status: 

Acute   





(2) Atrial fibrillation


Code(s): I48.91 - UNSPECIFIED ATRIAL FIBRILLATION   Status: Chronic   


Qualifiers: 


   Atrial fibrillation type: chronic   Qualified Code(s): I48.2 - Chronic 

atrial fibrillation   





(3) Diabetes type 2, controlled


Code(s): E11.9 - TYPE 2 DIABETES MELLITUS WITHOUT COMPLICATIONS   Status: 

Chronic   





(4) Dyslipidemia


Code(s): E78.5 - HYPERLIPIDEMIA, UNSPECIFIED   Status: Chronic   





(5) Osteoporosis


Code(s): M81.0 - AGE-RELATED OSTEOPOROSIS W/O CURRENT PATHOLOGICAL FRACTURE   

Status: Chronic   





(6) Parkinsonian features


Code(s): R25.9 - UNSPECIFIED ABNORMAL INVOLUNTARY MOVEMENTS   Status: Chronic   





(7) HTN (hypertension)


Code(s): I10 - ESSENTIAL (PRIMARY) HYPERTENSION   Status: Acute   





- Plan


Plan: 


Acute CVA


- Cardioembolic 2/2 paroxysmal afib vs watershed ischemia due to hypotension


- Pt appears to be back at baseline with no focal neurological deficits


- MRI: Acute multiple tiney lacunar infarts in watershed zone of right cerebral 

hemishphere. Small acute right posterior frontal lobe cortical infarct


- PT/OT 


- No CTA of neck, pt not candidate for endarterectomy


- Continue ASA, Plavix


- Carotid US with no high grade stenosis


- Not a candidate for anticoagulation due to recurrent falls. 





Asymptomatic Bradycardia


- Ectopic atrial rhythm


- As low as 39 overnight


- EKG at outside facility NSR with PACs


- EKG repeated here 10/1 with junctional rhythm


- Consulted Cardiology, apprec recs


- Pt had been on BB in past but after talking with PCP Dr Montaño she has not 

been taking it for some time now. Pulse was in the high 70's at admission. She 

did receive a dose the evening of 10/1/18 but this was discontinued due to her 

bradycardia overnight.


- EP consulted





Fall


- Slipped out of bed 10/1 onto left hip


- Xrays preformed prior to weakness/speech changes


- Uses walker to ambulate


- PT/OT eval





Paroxysmal Afib


- Continue to monitor on tele


- Not a candidate for anticoagulation due to recurrent falls.





Hypoglycemia


- Pt is DMII, she has had recurrent episodes of symptomatic hypoglycemia as low 

as nurse reported 30's.


- Overnight 


- Stopped Glimepiride 10/4





DMII


- Continue home Januvia


- Mild SSI


- ACHS accuchecks


- hypoglycemic protocol





Incontinence


- Oxybutynin





Bipolar Disorder


- Zoloft, abilify, Buspirone





HTN


- Holding metoprolol for bradycardia


- Consider starting med





IBS


- Continue home Bentyl





Osteoporosis


- Pt on Ca/Vit D3, and fosamax





Insomnia


- Continue home melatonin





Vertigo


- Continue home meclizine





Code Status: FULL


DVT ppx: Lovenox

## 2018-10-04 NOTE — CON
ELECTROPHYSIOLOGY CONSULTATION

 

Sarah Ventura NP dictating for Joshua Chadwick M.D.

 

DATE OF CONSULTATION:  10/03/2018.

 

REFERRING PHYSICIAN:  Emiliano Robison M.D.

 

REASON FOR CONSULTATION:  Bradycardia and atrial fibrillation.

 

HISTORY OF PRESENT ILLNESS:  Ms. Álvarez is a 79-year-old woman who presented to the emergency room in
hospitals with stroke-like symptoms of difficulty speaking and weakness.  She carries a diagnosis of pa
roxysmal atrial fibrillation and reportedly fell in her home of residence and had difficulty speaking
 after that.  She was brought to the hospital for further evaluation.  Upon arrival, she was found to
 be bradycardic with heart rates in the 30-40 beat per minute range.  Her home medication list said t
hat she was on metoprolol 25 mg b.i.d., although there is some discrepancy as to whether or not she i
s actually taking this medication.  She has had a stroke in the past and has not been on anticoagulat
ion due to her fall and underlying baseline dementia and the risk for bleeding.

 

Today, Ms. Álvarez is resting comfortably and is without cardiac concern or complaint.  She is not hav
ing any heart racing, palpitations, or chest pain.

 

REVIEW OF SYSTEMS:  Twelve-point review of systems was conducted to the best of the ability and is ne
gative except that listed in HPI but was difficult to obtain.

 

PAST MEDICAL HISTORY:

1.  Paroxysmal atrial fibrillation.

2.  Parkinson's disease.

3.  Dementia.

4.  Hypertension.

5.  History of cerebrovascular accident.

6.  History of frequent falls.

7.  Prior hip fracture.

 

PAST SURGICAL HISTORY:  Hip repair and cholecystectomy.

 

HOME MEDICATIONS:  Include guaifenesin syrup as needed, Combivent as needed, loperamide as needed, mi
lk of magnesia as needed, Lasix 20 mg daily, Abilify 40 mg p.o. at bedtime, K-Dur 10 mEq q.a.m., Zolo
ft 200 mg daily, oxybutynin 5 mg p.o. at bedtime, Lopressor 25 mg p.o. b.i.d., melatonin at bedtime, 
Antivert 25 mg p.o. t.i.d., Januvia 1 tab p.o. daily, Fosamax 70 mg weekly, calcium with vitamin D da
luis alfredo, Buspirone 10 mg b.i.d., Bentyl 20 mg p.o. t.i.d., amantadine 100 mg p.o. b.i.d., Amaryl 4 mg p.o
. q.a.m. and aspirin 81 mg daily.

 

SOCIAL HISTORY:  Resident of [facility] and [facility].  Denies alcohol, tobacco or illicit
 drug use.

 

FAMILY HISTORY:  Mother passed away from myocardial infarction.

 

LABORATORY DATA:  Brother, Donal Arevalo is closely involved in her care at her preference and contact 
numbers are [phone] and [phone].

 

PHYSICAL EXAMINATION:

VITAL SIGNS:  Most recent vital signs; 98.3, pulse 50, respirations 16, oxygen 92% on room air, blood
 pressure 145/67, respirations 14.

GENERAL:  This is an elderly woman in no apparent distress.  She is well groomed, well nourished.

NECK:  Supple without jugular venous distention.

CHEST:  Clear to auscultation without wheezes, crackles or rhonchi.  Respirations are even and unlabo
red.

HEART:  Rate is regularly regular but slow.

ABDOMEN:  Obese, soft, and nontender without palpable masses.  Hepatojugular reflux is negative.

NEUROLOGIC:  Gait is stable.

HEMATOLOGY:  Unremarkable.  

 

DATABASE:  Recent laboratory was reviewed.  Potassium 3.8, creatinine 0.78.

 

Telemetry and EKGs were all personally reviewed and initially show sinus bradycardia, initially with 
rates in the 30-40 beat per minute range.  She has also had a 12-lead that shows coarse atrial fibril
lation with a controlled ventricular rate in the 70s as well as some junctional bradycardia with a ve
ntricular rate of 48.  He is currently in sinus bradycardia with heart rates in the high 40s and low 
50s.

 

ASSESSMENT AND PLAN:

1.  Status post recent fall.

2.  Paroxysmal atrial fibrillation.

3.  Sick sinus syndrome with possible beta blocker effect.

4.  Recent stroke-like activity, cerebrovascular accident.

5.  Mild dementia.

 

RECOMMENDATIONS:

1.  We will monitor overnight off beta blocker therapy for any improvement as any potential beta bloc
kers can wash out of her system.  We will consider pacemaker implantation if no improvement is seen. 
 She has been off beta blocker since admission on 10/02/2018.

2.  Oral anticoagulation is somewhat tough to recommend with Ms. Álvarez.  Her CHADS-VASc score is at 
least 4 for advanced age, female gender and history of hypertension, although with her multiple falls
 and hip fractures in the past, this certainly does not come without risk.  We will await neurologic 
guidance once she is stable and once oral anticoagulation could be a consideration.  Alternatively, i
f short term, oral anticoagulation as tolerated.  A Watchman left atrial appendage closure device cou
ld be considered for continued protection without need for long-term oral anticoagulation.

 

Thank you for allowing us to participate in the care of this patient.  We will continue to follow thr
ough her hospitalization.

## 2018-10-04 NOTE — PDOC.CTH
<Sarah Ventura - Last Filed: 10/04/18 15:11>





Cardiology Progress Note





- Subjective





EP progress note:





Patient seen and evaluated. No new cardiac concerns or complaints.  No heart 

racing or palpitations. No passing out. 





- Objective


 Vital Signs











  Temp Pulse Pulse Pulse Resp BP BP


 


 10/04/18 11:57  99.2 F  51 L    16  


 


 10/04/18 09:40    67  54 L   197/74 H  152/65 H


 


 10/04/18 08:00  98.1 F  58 L    16  


 


 10/04/18 04:00  97.7 F  45 L    16  














  BP Pulse Ox Pulse Ox Pulse Ox


 


 10/04/18 11:57  177/76 H  93 L  


 


 10/04/18 09:40    94 L  93 L


 


 10/04/18 08:00  188/76 H  92 L  


 


 10/04/18 04:00  151/69 H  95  








 











Weight                         169 lb 4.8 oz














 











 10/03/18 10/04/18 10/05/18





 06:59 06:59 06:59


 


Intake Total 720 1450 540


 


Balance 720 1450 540














- Physical Examination


General/Neuro: alert & oriented x3, NAD


Neck: carotid US brisk, no JVD present


Lungs: CTA, unlabored respirations


Heart: PMI normal, RRR (Bradycardic)


Abdomen: no HSM, NT/ND





- Telemetry


Telemetry Rhythm: sinus bradycardia





- Assessment/Plan





1. Sick sinus syndrome with severe sinus bradycardia and occasionally 

junctional rhythm: Now heart rates 45-55bpm. At least day 3 off beta blocker 

therapy.  


2. Paroxysmal atrial fibrillation


3. CHADS VASC: 4,  No OAC with recent CVA and high risk for bleeds with 

multiple falls, hip fracture, and dementia


4. CVA


5. Dementia





Recommendations:





Heart rate remains steady and slow. Poor candidate for OAC with her high risk 

for bleeding complications. Suppression of her AF may be the wisest path to 

avoid OAC. Amiodarone is a consideration but would only further lower her heart 

rate.  Pacemaker support would alleviate the dangers of further bradycardia 

with antiarrhythmic drugs. Recommend dual chamber pacemaker at this time. 

Attempted to contact her brother at her request and left him a message to call 

the hospital back. She would like this discussed with him before she gives 

final consent. We did discuss the risks, benefits, and alternatives to 

pacemaker implantation. Tentative, possible device implant tomorrow if consent 

is obtained and schedule permits. 





<Farrukh Lewis - Last Filed: 10/04/18 16:47>





Cardiology Progress Note





- Objective


 Vital Signs











  Temp Pulse Pulse Pulse Resp BP BP


 


 10/04/18 16:00  98.5 F  61    20  


 


 10/04/18 11:57  99.2 F  51 L    16  


 


 10/04/18 09:40    67  54 L   197/74 H  152/65 H


 


 10/04/18 08:00  98.1 F  58 L    16  














  BP Pulse Ox Pulse Ox Pulse Ox


 


 10/04/18 16:00  198/84 H  94 L  


 


 10/04/18 11:57  177/76 H  93 L  


 


 10/04/18 09:40    94 L  93 L


 


 10/04/18 08:00  188/76 H  92 L  








 











Weight                         169 lb 4.8 oz














 











 10/03/18 10/04/18 10/05/18





 06:59 06:59 06:59


 


Intake Total 720 1450 540


 


Balance 720 1450 540














Attending Addendum





- Attending Addendum


Date/Time: 10/04/18 4030





I personally evaluated the patient and discussed the management with Audrey.


I agree with the History, Examination, Assessment and Plan documented above 

with any addition or exceptions noted below.

## 2018-10-05 NOTE — PRG
DATE OF SERVICE: 10/05/2018

 

This is an addendum to the note of Dr. Michela Tai.

 

Ms. Álvarez after consulting with her family has decided to have a pacemaker placed.  She is sitting q
uietly in her chair in  no distress, awaiting her transfer to get this done.  Her A1c is only 4.1 and
 it has been elected to stop all of her diabetic medications as she has had several episodes of hypog
lycemia.

 

Her glucose currently is 125.  We will follow with Cardiology Service. She would likely be ready for 
discharge after pacemaker placement.

## 2018-10-05 NOTE — PDOC.FM
- Subjective


Subjective: 


Doing well. No overnight events, no feelings of hypoglycemia. Agreeable to 

pacemaker placement today. No questions or complaints





- Objective


MAR Reviewed: Yes


Vital Signs & Weight: 


 Vital Signs (12 hours)











  Temp Pulse Resp BP Pulse Ox


 


 10/05/18 04:00  98.5 F  56 L  18  125/60  94 L


 


 10/05/18 00:00  98 F  51 L  18  138/65  93 L


 


 10/04/18 20:00  98.7 F  61  16  152/65 H  93 L








 Weight











Weight                         76.793 kg














I&O: 


 











 10/03/18 10/04/18 10/05/18





 06:59 06:59 06:59


 


Intake Total 720 1450 1360


 


Balance 720 1450 1360














Phys Exam





- Physical Examination


Constitutional: NAD


Respiratory: no wheezing, clear to auscultation bilateral


Cardiovascular: RRR


Gastrointestinal: soft, non-tender, positive bowel sounds


Musculoskeletal: no edema, pulses present


Psychiatric: normal affect, A&O x 3





Dx/Plan


(1) TIA (transient ischemic attack)


Code(s): G45.9 - TRANSIENT CEREBRAL ISCHEMIC ATTACK, UNSPECIFIED   Status: 

Acute   





(2) Atrial fibrillation


Code(s): I48.91 - UNSPECIFIED ATRIAL FIBRILLATION   Status: Chronic   


Qualifiers: 


   Atrial fibrillation type: chronic   Qualified Code(s): I48.2 - Chronic 

atrial fibrillation   





(3) Diabetes type 2, controlled


Code(s): E11.9 - TYPE 2 DIABETES MELLITUS WITHOUT COMPLICATIONS   Status: 

Chronic   





(4) Dyslipidemia


Code(s): E78.5 - HYPERLIPIDEMIA, UNSPECIFIED   Status: Chronic   





(5) Osteoporosis


Code(s): M81.0 - AGE-RELATED OSTEOPOROSIS W/O CURRENT PATHOLOGICAL FRACTURE   

Status: Chronic   





(6) Parkinsonian features


Code(s): R25.9 - UNSPECIFIED ABNORMAL INVOLUNTARY MOVEMENTS   Status: Chronic   





(7) HTN (hypertension)


Code(s): I10 - ESSENTIAL (PRIMARY) HYPERTENSION   Status: Acute   





- Plan


Plan: 


Acute CVA


- Cardioembolic 2/2 paroxysmal afib vs watershed ischemia due to hypotension


- Pt appears to be back at baseline with no focal neurological deficits


- MRI: Acute multiple tiney lacunar infarts in watershed zone of right cerebral 

hemishphere. Small acute right posterior frontal lobe cortical infarct


- PT/OT 


- Continue ASA, Plavix


- Carotid US with no high grade stenosis


- Not a candidate for anticoagulation due to recurrent falls, hip fracture 

dementia.





Bradycardia


- Initial Sinus Shine in 30's. EKG with a-fib, rate in 70's. 


- EKG 10/1 with junctional rhythm at rate of 48. 


- Consulted Cardiology, apprec recs


- Currently not on BB


- EP consulted, possible pacemaker placement today





Fall


- Slipped out of bed 10/1 onto left hip


- Xrays preformed prior to weakness/speech changes


- Uses walker to ambulate


- PT/OT eval





Paroxysmal Afib


- Continue to monitor on tele


- Not a candidate for anticoagulation due to recurrent falls.





Hypoglycemia


- Pt is DMII, she has had recurrent episodes of symptomatic hypoglycemia as low 

as nurse reported 30's.


- Overnight 


- Stopped Glimepiride 10/4, Januvia 10/5





DMII


- Continue home Januvia


- Mild SSI


- ACHS accuchecks


- hypoglycemic protocol





Incontinence


- Oxybutynin





Bipolar Disorder


- Zoloft, abilify, Buspirone





HTN


- Avoid Beta Blockers


- Consider starting med





IBS


- Continue home Bentyl





Osteoporosis


- Pt on Ca/Vit D3, and fosamax





Insomnia


- Continue home melatonin





Vertigo


- Continue home meclizine





Code Status: FULL


DVT ppx: Lovenox

## 2018-10-06 NOTE — PDOC.FM
- Subjective


Subjective: 





NAEO. No complaints this AM. Denies any chest pain or constipation. States she 

feels well this AM. 





- Objective


MAR Reviewed: Yes


Vital Signs & Weight: 


 Vital Signs (12 hours)











  Temp Pulse Resp BP Pulse Ox


 


 10/06/18 04:00  97.7 F  60  19  155/70 H  91 L


 


 10/06/18 00:00  98.0 F  60  19  148/67 H  92 L


 


 10/05/18 20:30      92 L


 


 10/05/18 19:55  98.8 F  76  18  126/67  92 L








 Weight











Weight                         76.793 kg














I&O: 


 











 10/04/18 10/05/18 10/06/18





 06:59 06:59 06:59


 


Intake Total 1450 1360 


 


Balance 1450 1360 











Result Diagrams: 


 10/05/18 13:50





Phys Exam





- Physical Examination


Constitutional: NAD


HEENT: sclera anicteric


Neck: full ROM


Respiratory: no wheezing, clear to auscultation bilateral


Cardiovascular: RRR, no significant murmur


Gastrointestinal: soft, non-tender, no distention, positive bowel sounds


Neurological: non-focal, moves all 4 limbs


Psychiatric: normal affect, A&O x 3


Skin: no rash, normal turgor





Dx/Plan


(1) HTN (hypertension)


Code(s): I10 - ESSENTIAL (PRIMARY) HYPERTENSION   Status: Chronic   





(2) TIA (transient ischemic attack)


Code(s): G45.9 - TRANSIENT CEREBRAL ISCHEMIC ATTACK, UNSPECIFIED   Status: 

Acute   





(3) Atrial fibrillation


Code(s): I48.91 - UNSPECIFIED ATRIAL FIBRILLATION   Status: Chronic   


Qualifiers: 


   Atrial fibrillation type: chronic   Qualified Code(s): I48.2 - Chronic 

atrial fibrillation   





(4) Diabetes type 2, controlled


Code(s): E11.9 - TYPE 2 DIABETES MELLITUS WITHOUT COMPLICATIONS   Status: 

Chronic   





(5) Dyslipidemia


Code(s): E78.5 - HYPERLIPIDEMIA, UNSPECIFIED   Status: Chronic   





(6) Osteoporosis


Code(s): M81.0 - AGE-RELATED OSTEOPOROSIS W/O CURRENT PATHOLOGICAL FRACTURE   

Status: Chronic   





(7) Parkinsonian features


Code(s): R25.9 - UNSPECIFIED ABNORMAL INVOLUNTARY MOVEMENTS   Status: Chronic   





- Plan


Plan: 





Plan: 


Acute CVA


- Cardioembolic 2/2 paroxysmal afib vs watershed ischemia due to hypotension. 

CVA seen on MRI. 


- Pt appears to be back at baseline with no focal neurological deficits.


- PT/OT on board.


- Will continue ASA & Plavix


- Carotid US showed no high grade stenosis.


- Not a candidate for chronic oral anticoagulation due to recurrent falls, & h/

o hip fracture & dementia.





Bradycardia, resolved


- Initial Sinus Shine in 30's. EKG with a-fib, rate in 60-70's overnight. 


- Consulted Cardiology, apprec recs.


- EP consulted, pacemaker placed yesterday.





h/o Fall


- Slipped out of bed 10/1 onto left hip.


- Uses walker to ambulate.


- PT/OT on board. 


- Will continue home meclizine for vertigo.





Paroxysmal Afib


- Will continue to monitor on telemetry.


- Not a candidate for anticoagulation due to recurrent falls.


- Pacemaker placed yesterday. Will change medication regimen per recs of cards 

and EP. 





Hypoglycemia


- Resolved. All latest BG levels > 100. 


- Stopped Glimepiride but restarted Januvia.


- Will continue januvia only as no BG levels have been > 200. Will consider 

switching to small dose of PO metformin as eGFR is ~60 per latest BMP and BG 

A1c shows good control. 


- Prefer pt have slight hyperglycemia rather than hypoglycemia in pt since 

already a fall risk.  





DMII


- Well controlled w/ A1c of 5.1 in hospital. 


- Will consider switching from januvia to small dose of PO metformin since 

patient has been hypoglycemic in hospital multiple times. 


- Will continue Mild SSI & ACHS accuchecks.


- Will continue hypoglycemic protocol.





Incontinence


- Continue Oxybutynin





Bipolar Disorder


- Will continue Zoloft, abilify, & Buspirone





HTN


- Will avoid Beta Blockers 2/2 bradycardia. 


- Consider starting ACE-I for BP due to DM history & good renal fxn. 





IBS


- Continue home Bentyl





Osteoporosis


- Will continue Ca/Vit D3, and fosamax.





Insomnia


- Continue home melatonin.





Vertigo


- Continue home meclizine.





Dementia


- Will continue home amantidine. 





Code Status: FULL


DVT ppx: Lovenox


Dispo: Referral was sent thru ecin yesterday to [facility] for 

approval for return possibly over the weekend. Spoke with Nicole from [facility] who said 

they can accept back over the weekend and staff will need to call station 1 

nurse to get fax number to send orders; phone [phone]. Could potentially d/

c to [facility] later today pending EP recs.

## 2018-10-08 NOTE — DIS
DATE OF ADMISSION:  10/01/2018

 

DATE OF DISCHARGE:  10/06/2018

 

RESIDENT:  Michela Tai, PGY1.

 

ADMITTING ATTENDING:  Gilles Estrella MD

 

DISCHARGE ATTENDING:  Gilles Estrella MD

 

CONSULTATIONS:

1.  Cardiology.

2.  Electrophysiology.

3.  Neurology.

 

PROCEDURES:

1.  Brain MRI; acute multiple tiny lacunar infarctions in the watershed zone of the right cerebral he
misphere, small acute right posterior frontal lobe cortical infarction.

2.  Carotid Doppler study, no hemodynamically significant stenosis, moderate atherosclerosis.

3.  Echocardiogram; EF of 60-65%, mild tricuspid and mitral regurgitation.

4.  Pacemaker placement.

 

PRIMARY DIAGNOSES:

1.  Acute cerebrovascular accident.

2.  Bradycardia, status post pacemaker placement.

 

SECONDARY DIAGNOSES:

1.  History of fall.

2.  Paroxysmal atrial fibrillation.

3.  Hypoglycemia.

4.  Type 2 diabetes.

5.  Incontinence.

6.  Bipolar disorder.

7.  Hypertension.

8.  Irritable bowel syndrome.

9.  Osteoporosis.

10.  Insomnia.

11.  Vertigo.

12.  Dementia.

 

DISCHARGE MEDICATIONS:

1.  Tylenol 650 q.6 hours.

2.  Fosamax 70 mg for 7 days.

3.  New medication (Maalox) 30 mL q.4 hours p.r.n.

4.  Amantadine 100 mg b.i.d.

5.  Amiodarone 200 mg b.i.d.

6.  Abilify 40 mg at bedtime.

7.  Aspirin 81 mg daily.

8.  Atorvastatin 40 mg at bedtime.

9.  Buspirone 10 mg b.i.d.

10.  Calcium and vitamin D.

11.  Keflex 500 mg q.6 hours for 7 days.

12.  New medication (Bentyl) 20 mg t.i.d.

13.  Lasix 20 mg daily.

14.  Combivent 2 inhalations b.i.d.

15.  Loperamide 2 mg p.r.n.

16.  Milk of mag 400 mg b.i.d.

17.  Meclizine 25 mg t.i.d.

18.  Melatonin 10 mg at bedtime.

19.  Metformin 500 mg q.a.m.

20.  Oxybutynin 5 mg at bedtime.

21.  Potassium chloride 20 mEq daily.

20.  Zoloft 200 mg daily.

 

DISCONTINUED MEDICATIONS:

1.  Glimepiride.

2.  Januvia.

 

HISTORY OF PRESENT ILLNESS AND HOSPITAL COURSE:  Ms. Álvarez is a pleasant 79-year-old female who is a
 nursing home resident in the [facility].  She has a past medical history of fibrillation 
and type 2 diabetes and uses a walker to ambulate.  She presented with left-sided weakness and speech
 changes.  MRI showed acute CVA.  She had a Doppler of the neck showing no acute signs of stenosis, h
er symptoms resolved, and she was evaluated by PT/OT and continued on aspirin, though she was not a c
andidate for anticoagulation for her atrial fibrillation due to her recurrent falls.  While she was h
ere, she was noted to have asymptomatic bradycardia as low as 30s and an EKG with atrial fibrillation
 rate in the 70s.  There was an EKG showing junctional rhythm.  Cardiology was consulted.  After furt
her investigation, she had not been taking the beta blocker prior to admission.  She received one dos
e on the first night of admission, but she continued to have bradycardia even after the medication ha
d been discontinued.  EP was consulted and patient was a candidate for pacemaker placement and amioda
artie was started after.  As per her fall, she slipped out of bed the morning of her admission onto he
r left hip.  X-rays were performed prior to her speech and weakness changes.  She said this was a mec
hanical fall where she slipped on the blanket.  She has a history of fall with bilateral hip fracture
s in the past.  This is why she was not a candidate for anticoagulation for her paroxysmal atrial fib
rillation.

 

Hypoglycemia was noted throughout her hospital stay down as low as the 30s.  She was symptomatic duri
ng these episodes with sweating and altered mental status.  She had been receiving glimepiride and Ja
tanmay at the nursing home.  Hemoglobin A1c was obtained and 5.1% that had been noted to be 4.8% in [facility] prior to this.  Glimepiride and Januvia were stopped for this reason.  Her blood sugars were jonathan
rly well controlled with no episodes of hypoglycemia after this.  This will need to be followed up wi
th her PCP as an outpatient.

 

A small dose of 500 mg metformin was added to reduce the risk of hypoglycemia and further control of 
blood sugars. For her chronic conditions of incontinence, bipolar, hypertension, IBS, osteoporosis, i
nsomnia, vertigo, dementia, her home medications were continued and these were stable throughout her 
hospital course.

 

DISPOSITION:  Stable.

 

DISCHARGE INSTRUCTIONS:

1.  Location:  [facility].

2.  Diet:  Heart healthy diabetic diet.

3.  Activity:  No restrictions.

4.  Followup:  With PCP within 3-7 days and Dr. Robison within 2-3 weeks and Dr. Lewis within 1-2 [facility].

## 2018-10-08 NOTE — ADD-PRG
Please see the note from Dr. Sol Posey, for which I agree.

 

The patient was seen, evaluated, and examined with the residents by the bedside.

 

This is a 79-year-old female, who is status post pacer that was placed because of recently sick sinus
 syndrome.  She came in initially with a stroke as well, but those things are fairly stable and now I
 am going to get an amiodarone taper for the atrial fibrillation.  No major residual neurologic defic
it, still a little bit of left hand weakness.  It sounds like she may be able to get home today, but 
otherwise, HEENT is normal.  Chest is clear.  Cardiovascular, regular rate and rhythm.  Abdomen is be
nign.  _____ a little bit of left hand weakness this morning and her pacemaker site looked clean, dry
, and intact.